# Patient Record
Sex: FEMALE | Race: WHITE
[De-identification: names, ages, dates, MRNs, and addresses within clinical notes are randomized per-mention and may not be internally consistent; named-entity substitution may affect disease eponyms.]

---

## 2018-03-21 ENCOUNTER — HOSPITAL ENCOUNTER (EMERGENCY)
Dept: HOSPITAL 97 - ER | Age: 23
Discharge: HOME | End: 2018-03-21
Payer: COMMERCIAL
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
## 2018-03-21 DIAGNOSIS — M25.511: Primary | ICD-10-CM

## 2018-03-21 PROCEDURE — 99283 EMERGENCY DEPT VISIT LOW MDM: CPT

## 2018-03-21 NOTE — ER
Nurse's Notes                                                                                     

 [facility]                                                                

Name: Stephanie Jeong                                                                             

Age: 22 yrs                                                                                       

Sex: Female                                                                                       

: 1995                                                                                   

MRN: [id number]                                                                                   

Arrival Date: 2018                                                                          

Time: 22:21                                                                                       

Account#: [id number]                                                                            

Bed 14                                                                                            

Private MD:                                                                                       

Diagnosis: Pain in right shoulder                                                                 

                                                                                                  

Presentation:                                                                                     

                                                                                             

22:31 Presenting complaint: Patient states: R shoulder pain x 3 days. States, "I don't know   aa1 

      what I did. think I pulled a muscle or something.". Transition of care: patient was not     

      received from another setting of care. Onset of symptoms was 2018. Care prior     

      to arrival: None.                                                                           

22:31 Method Of Arrival: Ambulatory                                                           aa1 

22:31 Acuity: RAYA 4                                                                           aa1 

                                                                                                  

OB/GYN:                                                                                           

22:32 LMP N/A - Recent birth                                                                  aa1 

                                                                                                  

Historical:                                                                                       

- Allergies:                                                                                      

22:32 No Known Allergies;                                                                     aa1 

- Home Meds:                                                                                      

22:32 None [Active];                                                                          aa1 

- PMHx:                                                                                           

22:32 None;                                                                                   aa1 

- PSHx:                                                                                           

22:32 ;                                                                              aa1 

                                                                                                  

- Immunization history:: Flu vaccine is not up to date.                                           

- Social history:: Smoking status: Patient/guardian denies using tobacco.                         

                                                                                                  

                                                                                                  

Screenin:34 Abuse screen: Denies threats or abuse. Denies injuries from another. Nutritional        aa1 

      screening: No deficits noted. Tuberculosis screening: No symptoms or risk factors           

      identified. Fall Risk None identified.                                                      

                                                                                                  

Assessment:                                                                                       

22:34 General: Appears in no apparent distress. comfortable, Behavior is calm, cooperative,   aa1 

      appropriate for age. Pain: Complains of pain in anterior aspect of right shoulder and       

      posterior aspect of right shoulder Pain currently is 8 out of 10 on a pain scale. Pain      

      began 2-3 days ago. Neuro: Level of Consciousness is awake, alert, obeys commands,          

      Oriented to person, place, time, situation, Appropriate for age Moves all extremities.      

      Full function Gait is steady. Respiratory: Airway is patent Respiratory effort is even,     

      unlabored, Respiratory pattern is regular, symmetrical. GI: No signs and/or symptoms        

      were reported involving the gastrointestinal system. : No signs and/or symptoms were      

      reported regarding the genitourinary system. EENT: No signs and/or symptoms were            

      reported regarding the EENT system. Derm: Skin is intact, is healthy with good turgor,      

      Skin is pink, warm \T\ dry. Musculoskeletal: Circulation, motion, and sensation intact.     

      Capillary refill < 3 seconds, Range of motion: limited in right shoulder.                   

23:37 Reassessment: Patient appears in no apparent distress at this time. Patient is alert,   aa1 

      oriented x 3, equal unlabored respirations, skin warm/dry/pink. Discussed d/c \T\ f/u       

      instructions with pt \T\ spouse; denies questions or concerns at this time.                 

                                                                                                  

Vital Signs:                                                                                      

22:32  / 86; Pulse 103; Resp 18; Temp 98.9; Pulse Ox 100% on R/A; Weight 86.18 kg;      aa1 

      Height 5 ft. 7 in. (170.18 cm); Pain 8/10;                                                  

23:28  / 84; Pulse 93; Resp 18; Pulse Ox 96% on R/A;                                    mt  

22:32 Body Mass Index 29.76 (86.18 kg, 170.18 cm)                                             aa1 

                                                                                                  

ED Course:                                                                                        

22:21 Patient arrived in ED.                                                                  ds1 

22:31 Alma Sandoval RN is Primary Nurse.                                                      aa1 

22:32 Triage completed.                                                                       aa1 

22:32 Arm band placed on left wrist. Patient placed in an exam room, on a stretcher.          aa1 

22:34 Patient has correct armband on for positive identification. Bed in low position. Call   aa1 

      light in reach. Pulse ox on. NIBP on.                                                       

23:02 [facility]                                                        snw 

23:02 Virgilio Watkins MD is Attending Physician.                                             snw 

23:37 No provider procedures requiring assistance completed. Patient did not have IV access   aa1 

      during this emergency room visit.                                                           

                                                                                                  

Administered Medications:                                                                         

23:28 Drug: Norco 5 mg-325 mg 1 tabs Route: PO;                                               aa1 

                                                                                                  

                                                                                                  

Outcome:                                                                                          

23:27 Discharge ordered by MD.                                                                snw 

23:37 Discharged to home ambulatory, with significant other.                                  aa1 

23:37 Condition: good                                                                             

23:37 Discharge instructions given to patient, significant other, Instructed on discharge         

      instructions, follow up and referral plans. medication usage, Demonstrated                  

      understanding of instructions, follow-up care, medications, Prescriptions given X 1.        

23:38 Patient left the ED.                                                                    aa1 

                                                                                                  

Signatures:                                                                                       

Alma Sandoval RN RN   aa1                                                  

Marly Dawkins FNP-C                 FNP-CsnMaria Del Carmen Fitzgerald                                ds1                                                  

Adriana Han                             mt                                                   

                                                                                                  

**************************************************************************************************

## 2018-03-21 NOTE — EDPHYS
Physician Documentation                                                                           

 [facility]                                                                

Name: Stephanie Jeong                                                                             

Age: 22 yrs                                                                                       

Sex: Female                                                                                       

: 1995                                                                                   

MRN: [id number]                                                                                   

Arrival Date: 2018                                                                          

Time: 22:21                                                                                       

Account#: [id number]                                                                            

Bed 14                                                                                            

Private MD:                                                                                       

ED Physician Virgilio Watkins                                                                      

HPI:                                                                                              

                                                                                             

00:13 This 22 yrs old  Female presents to ER via Ambulatory with complaints of       snw 

      Shoulder Pain.                                                                              

00:13 The patient or guardian complains of pain. right trapezius. Context: The problem was    snw 

      sustained at home, resulted from an unknown reason, The patient reports no decreased        

      range of motion. The patient reports no obvious deformity. Onset: The symptoms/episode      

      began/occurred suddenly, 3 day(s) ago, and became persistent. Associated signs and          

      symptoms: The patient has no apparent associated signs or symptoms, Pertinent               

      negatives: no fever, no swelling, no redness, no breastfeeding, no shortness of breath.     

                                                                                                  

OB/GYN:                                                                                           

                                                                                             

22:32 LMP N/A - Recent birth                                                                  aa1 

                                                                                                  

Historical:                                                                                       

- Allergies:                                                                                      

22:32 No Known Allergies;                                                                     aa1 

- Home Meds:                                                                                      

22:32 None [Active];                                                                          aa1 

- PMHx:                                                                                           

22:32 None;                                                                                   aa1 

- PSHx:                                                                                           

22:32 ;                                                                              aa1 

                                                                                                  

- Immunization history:: Flu vaccine is not up to date.                                           

- Social history:: Smoking status: Patient/guardian denies using tobacco.                         

                                                                                                  

                                                                                                  

ROS:                                                                                              

                                                                                             

00:11 Constitutional: Negative for fever, chills, and weight loss, Eyes: Negative for injury, snw 

      pain, redness, and discharge, ENT: Negative for injury, pain, and discharge, Neck:          

      Negative for injury, pain, and swelling, Cardiovascular: Negative for chest pain,           

      palpitations, and edema, Respiratory: Negative for shortness of breath, cough,              

      wheezing, and pleuritic chest pain, Abdomen/GI: Negative for abdominal pain, nausea,        

      vomiting, diarrhea, and constipation, Back: Negative for injury and pain, : Negative      

      for injury, bleeding, discharge, and swelling, Skin: Negative for injury, rash, and         

      discoloration, Neuro: Negative for headache, weakness, numbness, tingling, and seizure.     

      MS/extremity: Positive for pain, of the right shoulder.                                     

                                                                                                  

Exam:                                                                                             

00:11 Constitutional:  This is a well developed, well nourished patient who is awake, alert,  snw 

      and in no acute distress. Head/Face:  Normocephalic, atraumatic. Eyes:  Pupils equal        

      round and reactive to light, extra-ocular motions intact.  Lids and lashes normal.          

      Conjunctiva and sclera are non-icteric and not injected.  Cornea within normal limits.      

      Periorbital areas with no swelling, redness, or edema. ENT:  Nares patent. No nasal         

      discharge, no septal abnormalities noted.  Tympanic membranes are normal and external       

      auditory canals are clear.  Oropharynx with no redness, swelling, or masses, exudates,      

      or evidence of obstruction, uvula midline.  Mucous membranes moist. Neck:  Trachea          

      midline, no thyromegaly or masses palpated, and no cervical lymphadenopathy.  Supple,       

      full range of motion without nuchal rigidity, or vertebral point tenderness.  No            

      Meningismus. Chest/axilla:  Normal chest wall appearance and motion.  Nontender with no     

      deformity.  No lesions are appreciated. Cardiovascular:  Regular rate and rhythm with a     

      normal S1 and S2.  No gallops, murmurs, or rubs.  Normal PMI, no JVD.  No pulse             

      deficits. Respiratory:  Lungs have equal breath sounds bilaterally, clear to                

      auscultation and percussion.  No rales, rhonchi or wheezes noted.  No increased work of     

      breathing, no retractions or nasal flaring. Abdomen/GI:  Soft, non-tender, with normal      

      bowel sounds.  No distension or tympany.  No guarding or rebound.  No evidence of           

      tenderness throughout. Back:  No spinal tenderness.  No costovertebral tenderness.          

      Full range of motion. Skin:  Warm, dry with normal turgor.  Normal color with no            

      rashes, no lesions, and no evidence of cellulitis. Neuro:  Awake and alert, GCS 15,         

      oriented to person, place, time, and situation.  Cranial nerves II-XII grossly intact.      

      Motor strength 5/5 in all extremities.  Sensory grossly intact.  Cerebellar exam            

      normal.  Normal gait.                                                                       

00:11 Musculoskeletal/extremity: Extremities: grossly normal except: noted in the right           

      shoulder: ROM: intact in all extremities, Circulation is intact in all extremities.         

      Sensation intact.                                                                           

                                                                                                  

Vital Signs:                                                                                      

                                                                                             

22:32  / 86; Pulse 103; Resp 18; Temp 98.9; Pulse Ox 100% on R/A; Weight 86.18 kg;      aa1 

      Height 5 ft. 7 in. (170.18 cm); Pain 8/10;                                                  

23:28  / 84; Pulse 93; Resp 18; Pulse Ox 96% on R/A;                                    mt  

22:32 Body Mass Index 29.76 (86.18 kg, 170.18 cm)                                             aa1 

                                                                                                  

MDM:                                                                                              

23:06 Patient medically screened.                                                             snw 

                                                                                             

00:12 Data reviewed: vital signs, nurses notes. Data interpreted: Pulse oximetry: on room air snw 

      is 96 %. Interpretation: acceptable. Counseling: I had a detailed discussion with the       

      patient and/or guardian regarding: the historical points, exam findings, and any            

      diagnostic results supporting the discharge/admit diagnosis, the presence of at least       

      one elevated blood pressure reading (>120/80) during this emergency department visit,       

      the need for outpatient follow up, to return to the emergency department if symptoms        

      worsen or persist or if there are any questions or concerns that arise at home. Special     

      discussion: Based on the history and exam findings, there is no indication for further      

      emergent testing or inpatient evaluation. I discussed with the patient/guardian the         

      need to see the primary care provider for further evaluation of the symptoms.               

                                                                                                  

Administered Medications:                                                                         

                                                                                             

23:28 Drug: Norco 5 mg-325 mg 1 tabs Route: PO;                                               aa1 

                                                                                                  

                                                                                                  

Disposition:                                                                                      

                                                                                             

07:09 Co-signature as Attending Physician, Virgilio Watkins MD I agree with the assessment and  alessandro 

      plan of care.                                                                               

                                                                                                  

Disposition:                                                                                      

18 23:27 Discharged to Home. Impression: Pain in right shoulder.                            

- Condition is Stable.                                                                            

- Discharge Instructions: Arthralgia, Musculoskeletal Pain, Shoulder Pain, Cryotherapy,           

  Easy-to-Read, Heat Therapy.                                                                     

- Prescriptions for orphenadrine citrate 100 mg Oral Tablet Sustained Release - take 1            

  tablet by ORAL route 2 times per day As needed; 20 tablet.                                      

- Medication Reconciliation Form, Thank You Letter, Antibiotic Education, Prescription            

  Opioid Use form.                                                                                

- Follow up: Private Physician; When: 1 - 2 days; Reason: Recheck today's complaints,             

  Continuance of care, Re-evaluation by your physician.                                           

                                                                                                  

                                                                                                  

                                                                                                  

Signatures:                                                                                       

Alma Sandoval, RN                        RN   aa1                                                  

Virgilio Watkins MD MD cha Therrien, Shelly, FNP-C                 FNP-Csnw                                                  

                                                                                                  

**************************************************************************************************